# Patient Record
Sex: FEMALE | Race: WHITE | NOT HISPANIC OR LATINO | URBAN - METROPOLITAN AREA
[De-identification: names, ages, dates, MRNs, and addresses within clinical notes are randomized per-mention and may not be internally consistent; named-entity substitution may affect disease eponyms.]

---

## 2023-04-14 ENCOUNTER — TELEPHONE (OUTPATIENT)
Dept: OBSTETRICS AND GYNECOLOGY | Facility: CLINIC | Age: 63
End: 2023-04-14
Payer: COMMERCIAL

## 2023-04-14 NOTE — TELEPHONE ENCOUNTER
Received a message from [facility] to assist in scheduling a new patient vulva clinic appointment. Patient is reporting vulva pain. I attempt reaching patient to offer next available. Left a voice mail message to call the office. Appointment scheduled 05/09/2023 for 11:00 am.

## 2023-04-14 NOTE — TELEPHONE ENCOUNTER
----- Message from Sepideh Shi RN sent at 4/14/2023  3:14 PM CDT -----  Can you schedule this patient for vulva pain? Thanks!  ----- Message -----  From: Mer Villegas MA  Sent: 4/14/2023   3:10 PM CDT  To: Sepideh Shi RN, [facility] Sepideh you can send a message to  staff box asking if they can contact patient will an appointment.  ----- Message -----  From: Sepideh Shi RN  Sent: 4/14/2023   9:53 AM CDT  To: Mer Villegas MA, [facility]    Hi,     This patient is on our schedule for vulva pain. I contacted the access rep that scheduled her and she says they are unable to make outbound calls. How should I proceed in getting the patient scheduled in an appropriate specialty?    Thanks!

## 2023-05-10 ENCOUNTER — TELEPHONE (OUTPATIENT)
Dept: UROGYNECOLOGY | Facility: CLINIC | Age: 63
End: 2023-05-10
Payer: COMMERCIAL

## 2023-05-11 ENCOUNTER — TELEPHONE (OUTPATIENT)
Dept: UROGYNECOLOGY | Facility: CLINIC | Age: 63
End: 2023-05-11
Payer: COMMERCIAL

## 2023-05-11 NOTE — TELEPHONE ENCOUNTER
Patient scheduled for vulva pain/RBC- not conditions we see. Previous message sent to Dr. Noyola' office for vulva clinic. LVM to explain this.    ----- Message from Teresa Lockwood MA sent at 5/10/2023 10:25 AM CDT -----    ----- Message -----  From: Mali Strickland  Sent: 5/10/2023  10:06 AM CDT  To: [facility] Staff    Patient was schedule on 05/23 at 8:00 am an her was cancel and patient want to know why.  Patient would like for someone to give her a call back in regards to this matter. Dr. Velasquez   was the doctor Contact# [phone]                                  Thank You!!!